# Patient Record
Sex: MALE | Race: WHITE | Employment: PART TIME | URBAN - NONMETROPOLITAN AREA
[De-identification: names, ages, dates, MRNs, and addresses within clinical notes are randomized per-mention and may not be internally consistent; named-entity substitution may affect disease eponyms.]

---
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
## 2022-01-29 ENCOUNTER — HEALTH MAINTENANCE LETTER (OUTPATIENT)
Age: 22
End: 2022-01-29

## 2022-09-17 ENCOUNTER — HEALTH MAINTENANCE LETTER (OUTPATIENT)
Age: 22
End: 2022-09-17

## 2023-05-06 ENCOUNTER — HEALTH MAINTENANCE LETTER (OUTPATIENT)
Age: 23
End: 2023-05-06

## 2025-02-20 ENCOUNTER — OFFICE VISIT (OUTPATIENT)
Dept: FAMILY MEDICINE | Facility: OTHER | Age: 25
End: 2025-02-20
Attending: FAMILY MEDICINE

## 2025-02-20 VITALS
HEART RATE: 50 BPM | BODY MASS INDEX: 28.18 KG/M2 | OXYGEN SATURATION: 96 % | TEMPERATURE: 98 F | WEIGHT: 212.6 LBS | HEIGHT: 73 IN | RESPIRATION RATE: 20 BRPM | DIASTOLIC BLOOD PRESSURE: 68 MMHG | SYSTOLIC BLOOD PRESSURE: 116 MMHG

## 2025-02-20 DIAGNOSIS — M53.3 SACROILIAC PAIN: Primary | ICD-10-CM

## 2025-02-20 ASSESSMENT — PAIN SCALES - GENERAL: PAINLEVEL_OUTOF10: SEVERE PAIN (7)

## 2025-02-20 NOTE — PROGRESS NOTES
"  SUBJECTIVE:   Vu Dixon is a 24 year old male who presents to clinic today for the following health issues:    HPI  Patient arrives here for low back pain.  Patient reports that he was on a ladder working.  He works for [employer].  Patient was removing ice from a vent duct he fell on the ladder.  Estimated he fell about 12 feet.  Complains of lower back pain.  There is no trauma to the head.  No loss of consciousness.    A screening tool isn t required but you may use one:  Find more information in the National [place] on Drug Abuse Screening and Assessment Tools Chart    Patient Active Problem List    Diagnosis Date Noted    Restless legs 07/20/2015     Priority: Medium    Insomnia 02/17/2012     Priority: Medium    Anxiety state 06/17/2011     Priority: Medium     Past Medical History:   Diagnosis Date    Otitis media     8/20/03, 9/17/03, 11/11/03, 12/23/03    Otitis media     03/10/05,L    Otitis media     01/31/06,R with acute sinusitis    Otitis media of both ears     11/17/06    Otitis media of both ears     02/26/09,left worse than right.    Otitis media of right ear     03/21/08    Pleural effusion, not elsewhere classified     03/08/07,06/20/08 resolved.      Past Surgical History:   Procedure Laterality Date    MYRINGOTOMY, INSERT TUBE, COMBINED      01/04,tubes out 03/09/05    OTHER SURGICAL HISTORY      3/5/16,[id number],CLOSED REDUCTION HUMERUS FRACTURE,Dr Britt       Review of Systems     OBJECTIVE:     /68   Pulse 50   Temp 98  F (36.7  C)   Resp 20   Ht 1.854 m (6' 1\")   Wt 96.4 kg (212 lb 9.6 oz)   SpO2 96%   BMI 28.05 kg/m    Body mass index is 28.05 kg/m .  Physical Exam  Musculoskeletal:      Comments: No bruising.  Pain on palpation over the right  SI joint   Skin:     General: Skin is warm.      Comments:   Small abrasion on the right elbow and also left buttocks   Neurological:      General: No focal deficit present.      Mental Status: He is alert.      " Comments: Gait normal.         Diagnostic Test Results:  none     ASSESSMENT/PLAN:         (M53.3) Sacroiliac pain  (primary encounter diagnosis) contusion  Comment: Naprosyn for pain control.  Follow-up if worsening.  Or any changes occur.  We also discussed leg weakness changes in bowel or bladder or perineal numbness.  Plan: XR Sacrum and Coccyx 2 Views              Berto Stover MD  [facility] AND [facility]

## 2025-02-20 NOTE — NURSING NOTE
Patient here for initial work comp after falling 12 feet off a roof landing on a ladder. Having pelvic pain. Medication Reconciliation: complete.    Jennifer Gauthier LPN  2/20/2025 12:45 PM

## 2025-03-09 ENCOUNTER — HEALTH MAINTENANCE LETTER (OUTPATIENT)
Age: 25
End: 2025-03-09

## 2025-09-02 ENCOUNTER — OFFICE VISIT (OUTPATIENT)
Dept: PEDIATRICS | Facility: OTHER | Age: 25
End: 2025-09-02
Attending: INTERNAL MEDICINE
Payer: COMMERCIAL

## 2025-09-02 ENCOUNTER — HOSPITAL ENCOUNTER (OUTPATIENT)
Dept: GENERAL RADIOLOGY | Facility: OTHER | Age: 25
Discharge: HOME | End: 2025-09-02
Attending: INTERNAL MEDICINE
Payer: COMMERCIAL

## 2025-09-02 VITALS
RESPIRATION RATE: 12 BRPM | DIASTOLIC BLOOD PRESSURE: 66 MMHG | HEART RATE: 56 BPM | OXYGEN SATURATION: 99 % | TEMPERATURE: 98.3 F | BODY MASS INDEX: 27.44 KG/M2 | SYSTOLIC BLOOD PRESSURE: 131 MMHG | WEIGHT: 208 LBS

## 2025-09-02 DIAGNOSIS — S69.92XA INJURY OF LEFT THUMB, INITIAL ENCOUNTER: ICD-10-CM

## 2025-09-02 DIAGNOSIS — S69.92XA INJURY OF LEFT THUMB, INITIAL ENCOUNTER: Primary | ICD-10-CM

## 2025-09-02 PROCEDURE — 73140 X-RAY EXAM OF FINGER(S): CPT | Mod: 26 | Performed by: RADIOLOGY

## 2025-09-02 PROCEDURE — 73140 X-RAY EXAM OF FINGER(S): CPT | Mod: LT

## 2025-09-02 ASSESSMENT — PAIN SCALES - GENERAL: PAINLEVEL_OUTOF10: NO PAIN (0)

## 2025-09-04 ENCOUNTER — HOSPITAL ENCOUNTER (OUTPATIENT)
Dept: MRI IMAGING | Facility: OTHER | Age: 25
End: 2025-09-04
Payer: COMMERCIAL

## 2025-09-04 DIAGNOSIS — S63.055A DISLOCATION OF CARPOMETACARPAL JOINT OF LEFT HAND, INITIAL ENCOUNTER: ICD-10-CM

## 2025-09-04 PROCEDURE — 73218 MRI UPPER EXTREMITY W/O DYE: CPT | Mod: LT

## (undated) RX ORDER — LIDOCAINE HCL/EPINEPHRINE/PF 2%-1:200K
VIAL (ML) INJECTION
Status: DISPENSED
Start: 2020-06-17

## (undated) RX ORDER — NEOMYCIN/BACITRACIN/POLYMYXINB 3.5-400-5K
OINTMENT (GRAM) TOPICAL
Status: DISPENSED
Start: 2020-06-17

## (undated) RX ORDER — LIDOCAINE HYDROCHLORIDE AND EPINEPHRINE 10; 10 MG/ML; UG/ML
INJECTION, SOLUTION INFILTRATION; PERINEURAL
Status: DISPENSED
Start: 2020-06-17

## (undated) RX ORDER — ONDANSETRON 4 MG/1
TABLET, ORALLY DISINTEGRATING ORAL
Status: DISPENSED
Start: 2020-06-17